# Patient Record
Sex: MALE | URBAN - METROPOLITAN AREA
[De-identification: names, ages, dates, MRNs, and addresses within clinical notes are randomized per-mention and may not be internally consistent; named-entity substitution may affect disease eponyms.]

---

## 2024-04-10 ENCOUNTER — OV CON (OUTPATIENT)
Dept: URBAN - METROPOLITAN AREA CLINIC 98 | Facility: CLINIC | Age: 69
End: 2024-04-10
Payer: MEDICAID

## 2024-04-10 VITALS
DIASTOLIC BLOOD PRESSURE: 88 MMHG | SYSTOLIC BLOOD PRESSURE: 142 MMHG | HEART RATE: 90 BPM | WEIGHT: 110.6 LBS | TEMPERATURE: 97.2 F

## 2024-04-10 DIAGNOSIS — Z93.1 STATUS POST INSERTION OF PERCUTANEOUS ENDOSCOPIC GASTROSTOMY (PEG) TUBE: ICD-10-CM

## 2024-04-10 DIAGNOSIS — Z86.73 STATUS POST CVA: ICD-10-CM

## 2024-04-10 PROBLEM — 428653001: Status: ACTIVE | Noted: 2024-04-10

## 2024-04-10 PROCEDURE — 99204 OFFICE O/P NEW MOD 45 MIN: CPT | Performed by: INTERNAL MEDICINE

## 2024-04-10 NOTE — PHYSICAL EXAM CONSTITUTIONAL:
in a wheel chair.  Has right hemiparesis.  Has expressive speech difficulty, but is able to communicate.

## 2024-04-10 NOTE — HPI-TODAY'S VISIT:
pt is here with his sister s/p CVA x 2  G tube was placed 7/2023 at [facility] Gina Stopped using the tube for feeding Nov or Dec 2023  g tube remained in place not being used until  2/2024, Around 3/2024 the tube came out on its own about a month ago Patient is able to communicate with some difficulty. He does not want another tube. He is able to eat and drink. His weight has been relatively stable although he is thin. He has right hemiparesis and in in wheel chair. We called the nursing facility and it appears that hei weight has been fairly stable at about 110 # He has dental problems and cannot chew well. He is getting soft, ground diet. He is getting ensure daily.